# Patient Record
Sex: FEMALE | Race: WHITE | NOT HISPANIC OR LATINO | Employment: FULL TIME | URBAN - METROPOLITAN AREA
[De-identification: names, ages, dates, MRNs, and addresses within clinical notes are randomized per-mention and may not be internally consistent; named-entity substitution may affect disease eponyms.]

---

## 2023-08-28 ENCOUNTER — OFFICE VISIT (OUTPATIENT)
Dept: UROLOGY | Facility: CLINIC | Age: 53
End: 2023-08-28
Payer: COMMERCIAL

## 2023-08-28 VITALS
DIASTOLIC BLOOD PRESSURE: 71 MMHG | HEIGHT: 64 IN | HEART RATE: 71 BPM | BODY MASS INDEX: 28.46 KG/M2 | WEIGHT: 166.69 LBS | SYSTOLIC BLOOD PRESSURE: 123 MMHG

## 2023-08-28 DIAGNOSIS — R39.15 URINARY URGENCY: Primary | ICD-10-CM

## 2023-08-28 DIAGNOSIS — N32.89 BLADDER SPASMS: ICD-10-CM

## 2023-08-28 LAB
BILIRUBIN, UA POC OHS: NEGATIVE
BLOOD, UA POC OHS: NEGATIVE
CLARITY, UA POC OHS: CLEAR
COLOR, UA POC OHS: YELLOW
GLUCOSE, UA POC OHS: NEGATIVE
KETONES, UA POC OHS: NEGATIVE
LEUKOCYTES, UA POC OHS: NEGATIVE
NITRITE, UA POC OHS: NEGATIVE
PH, UA POC OHS: 5.5
PROTEIN, UA POC OHS: NEGATIVE
SPECIFIC GRAVITY, UA POC OHS: >=1.03
UROBILINOGEN, UA POC OHS: 0.2

## 2023-08-28 PROCEDURE — 99999 PR PBB SHADOW E&M-NEW PATIENT-LVL V: ICD-10-PCS | Mod: PBBFAC,,, | Performed by: NURSE PRACTITIONER

## 2023-08-28 PROCEDURE — 3074F PR MOST RECENT SYSTOLIC BLOOD PRESSURE < 130 MM HG: ICD-10-PCS | Mod: CPTII,S$GLB,, | Performed by: NURSE PRACTITIONER

## 2023-08-28 PROCEDURE — 99204 PR OFFICE/OUTPT VISIT, NEW, LEVL IV, 45-59 MIN: ICD-10-PCS | Mod: S$GLB,,, | Performed by: NURSE PRACTITIONER

## 2023-08-28 PROCEDURE — 1160F PR REVIEW ALL MEDS BY PRESCRIBER/CLIN PHARMACIST DOCUMENTED: ICD-10-PCS | Mod: CPTII,S$GLB,, | Performed by: NURSE PRACTITIONER

## 2023-08-28 PROCEDURE — 81003 URINALYSIS AUTO W/O SCOPE: CPT | Mod: QW,S$GLB,, | Performed by: NURSE PRACTITIONER

## 2023-08-28 PROCEDURE — 81003 POCT URINALYSIS(INSTRUMENT): ICD-10-PCS | Mod: QW,S$GLB,, | Performed by: NURSE PRACTITIONER

## 2023-08-28 PROCEDURE — 3008F PR BODY MASS INDEX (BMI) DOCUMENTED: ICD-10-PCS | Mod: CPTII,S$GLB,, | Performed by: NURSE PRACTITIONER

## 2023-08-28 PROCEDURE — 99999 PR PBB SHADOW E&M-NEW PATIENT-LVL V: CPT | Mod: PBBFAC,,, | Performed by: NURSE PRACTITIONER

## 2023-08-28 PROCEDURE — 1159F MED LIST DOCD IN RCRD: CPT | Mod: CPTII,S$GLB,, | Performed by: NURSE PRACTITIONER

## 2023-08-28 PROCEDURE — 3078F PR MOST RECENT DIASTOLIC BLOOD PRESSURE < 80 MM HG: ICD-10-PCS | Mod: CPTII,S$GLB,, | Performed by: NURSE PRACTITIONER

## 2023-08-28 PROCEDURE — 3008F BODY MASS INDEX DOCD: CPT | Mod: CPTII,S$GLB,, | Performed by: NURSE PRACTITIONER

## 2023-08-28 PROCEDURE — 3078F DIAST BP <80 MM HG: CPT | Mod: CPTII,S$GLB,, | Performed by: NURSE PRACTITIONER

## 2023-08-28 PROCEDURE — 99204 OFFICE O/P NEW MOD 45 MIN: CPT | Mod: S$GLB,,, | Performed by: NURSE PRACTITIONER

## 2023-08-28 PROCEDURE — 1160F RVW MEDS BY RX/DR IN RCRD: CPT | Mod: CPTII,S$GLB,, | Performed by: NURSE PRACTITIONER

## 2023-08-28 PROCEDURE — 3074F SYST BP LT 130 MM HG: CPT | Mod: CPTII,S$GLB,, | Performed by: NURSE PRACTITIONER

## 2023-08-28 PROCEDURE — 1159F PR MEDICATION LIST DOCUMENTED IN MEDICAL RECORD: ICD-10-PCS | Mod: CPTII,S$GLB,, | Performed by: NURSE PRACTITIONER

## 2023-08-28 RX ORDER — OXYBUTYNIN CHLORIDE 5 MG/1
5 TABLET, EXTENDED RELEASE ORAL DAILY
Qty: 30 TABLET | Refills: 11 | Status: SHIPPED | OUTPATIENT
Start: 2023-08-28 | End: 2023-09-13 | Stop reason: SDUPTHER

## 2023-08-28 RX ORDER — ESZOPICLONE 1 MG/1
1 TABLET, FILM COATED ORAL NIGHTLY
COMMUNITY

## 2023-08-28 RX ORDER — DIPHENHYDRAMINE HCL 25 MG
25 CAPSULE ORAL NIGHTLY
COMMUNITY

## 2023-08-28 RX ORDER — TOPIRAMATE 50 MG/1
50 TABLET, FILM COATED ORAL DAILY
COMMUNITY
Start: 2023-02-16 | End: 2024-06-28

## 2023-08-28 RX ORDER — PREDNISONE 10 MG/1
10 TABLET ORAL DAILY PRN
COMMUNITY
Start: 2023-06-30

## 2023-08-28 RX ORDER — BUPROPION HYDROCHLORIDE 150 MG/1
150 TABLET ORAL EVERY MORNING
COMMUNITY
Start: 2023-06-30

## 2023-08-28 RX ORDER — HYOSCYAMINE SULFATE 0.12 MG/1
0.25 TABLET SUBLINGUAL 2 TIMES DAILY
COMMUNITY
Start: 2023-05-09 | End: 2023-08-28 | Stop reason: ALTCHOICE

## 2023-08-28 RX ORDER — TRAMADOL HYDROCHLORIDE 50 MG/1
50 TABLET ORAL EVERY 6 HOURS PRN
COMMUNITY
Start: 2023-03-23

## 2023-08-28 NOTE — PROGRESS NOTES
"CHIEF COMPLAINT:    Mrs Kothari is a 52 y.o. female presenting for bladder spasms.  PRESENTING ILLNESS:    Natalia Kothari is a 52 y.o. female who presents for bladder spasms. This is her initial clinic visit.    8/28/23  Pt presents today for bladder spasms. She feels she has a constant urge to void but does not have to void. She voids every few hours throughout the day and nocturia x 0-1. No UUI or SHANNAN. She finds symptoms have been ongoing for a few months.  Denies dysuria, gross hematuria, flank pain, fever, chills, nausea or vomiting today.  UA today negative  PVR 32 ml    She was treated for UTI 7/6/23 by PCP (e coli), with macrobid. UTI symptoms included back pain, frequency, dysuria and bladder spasms. She was seen at urgent care a few days after completing macrobid for UTI and prescribed another antibiotic but no culture obtained. She was seen again by PCP 8/8/23 for dysuria and repeat UA (abnormal but on pyridium) and culture no growth. She was also treated for yeast infection with diflucan.      Drinks: 3 16oz glasses of water daily, tea, wine  Has IBS, constipation and diarrhea    Hx of hysterectomy 1999, no bladder surgeries    History of kidney stones: denies  History of recurrent UTI: had UTI's as a child but none as adult until now  Personal or family hx of  malignancy: aunt with RCC, had nephrectomy. No hx of bladder cancer  History of  trauma: denies  Smoking history: former smoker, quit 2010    Urine cultures:   No results found for: "LABURIN"      REVIEW OF SYSTEMS:    Review of Systems    Constitutional: Negative for fever and chills.   HENT: Negative for hearing loss.   Eyes: Negative for visual disturbance.   Respiratory: Negative for shortness of breath.   Cardiovascular: Negative for chest pain.   Gastrointestinal: Negative for nausea, vomiting  Genitourinary:  See above  Neurological: Negative for dizziness.   Hematological: Does not bruise/bleed easily.   Psychiatric/Behavioral: " Negative for confusion.     PATIENT HISTORY:    Past Medical History:   Diagnosis Date    Arthritis     Autoimmune disease     swelling of joints with pain    H/O:  section     Pancreatitis     PONV (postoperative nausea and vomiting)     Sinusitis        Past Surgical History:   Procedure Laterality Date    CHOLECYSTECTOMY      gastric sleeve      HYSTERECTOMY      SHOULDER ARTHROSCOPY         Family History   Problem Relation Age of Onset    Collagen disease Neg Hx        Social History     Socioeconomic History    Marital status:    Tobacco Use    Smoking status: Former    Smokeless tobacco: Never    Tobacco comments:     5 years quit    Substance and Sexual Activity    Alcohol use: Yes     Alcohol/week: 3.0 standard drinks of alcohol     Types: 3 Glasses of wine per week     Comment: 3 glasses/wk     Drug use: No       Allergies:  Nsaids (non-steroidal anti-inflammatory drug) and Sulfa (sulfonamide antibiotics)    Medications:    Current Outpatient Medications:     b complex vitamins tablet, Take 1 tablet by mouth once daily., Disp: , Rfl:     buPROPion (WELLBUTRIN XL) 150 MG TB24 tablet, Take 150 mg by mouth every morning., Disp: , Rfl:     calcium carbonate (OS-AVELINA) 500 mg calcium (1,250 mg) tablet, Take 1 tablet by mouth once daily., Disp: , Rfl:     cetirizine (ZYRTEC) 10 MG tablet, Take 10 mg by mouth once daily., Disp: , Rfl:     diphenhydrAMINE (BENADRYL) 25 mg capsule, Take 25 mg by mouth every evening., Disp: , Rfl:     eszopiclone (LUNESTA) 1 MG Tab, Take 1 mg by mouth nightly., Disp: , Rfl:     ferrous sulfate 325 mg (65 mg iron) Tab tablet, Take 325 mg by mouth daily with breakfast., Disp: , Rfl:     fluoxetine (PROZAC) 40 MG capsule, Take 40 mg by mouth once daily., Disp: , Rfl:     fluticasone (FLONASE) 50 mcg/actuation nasal spray, , Disp: , Rfl:     hydroxychloroquine (PLAQUENIL) 200 mg tablet, Take 200 mg by mouth 2 (two) times daily. , Disp: , Rfl:     methocarbamol (ROBAXIN) 500  MG Tab, Take 750 mg by mouth 3 (three) times daily., Disp: , Rfl:     multivitamin capsule, Take 1 capsule by mouth once daily., Disp: , Rfl:     omeprazole (PRILOSEC) 40 MG capsule, Take 40 mg by mouth once daily., Disp: , Rfl:     predniSONE (DELTASONE) 10 MG tablet, Take 10 mg by mouth daily as needed., Disp: , Rfl:     topiramate (TOPAMAX) 50 MG tablet, Take 50 mg by mouth once daily., Disp: , Rfl:     traMADoL (ULTRAM) 50 mg tablet, Take 50 mg by mouth every 6 (six) hours as needed., Disp: , Rfl:     vitamin D 1000 units Tab, Take 185 mg by mouth once daily., Disp: , Rfl:     cyanocobalamin (VITAMIN B-12) 1,000 mcg/mL injection, 1,000 mcg every 28 days., Disp: , Rfl:     hydrocodone-acetaminophen 5-325mg (NORCO) 5-325 mg per tablet, Take 1 tablet by mouth every 6 (six) hours as needed for Pain., Disp: 40 tablet, Rfl: 0    oxybutynin (DITROPAN-XL) 5 MG TR24, Take 1 tablet (5 mg total) by mouth once daily., Disp: 30 tablet, Rfl: 11    PHYSICAL EXAMINATION:    Constitutional: She is oriented to person, place, and time. She appears well-developed and well-nourished.  She is in no apparent distress.    Neck: Normal ROM.     Cardiovascular: Normal rate.      Pulmonary/Chest: Effort normal. No respiratory distress.     Abdominal:  She exhibits no distension.  There is no CVA tenderness.     Neurological: She is alert and oriented to person, place, and time.     Skin: Skin is warm and dry.     Psych: Cooperative with normal affect.    Genitourinary:  Normal external female genitalia  Urethral meatus is normal  Mild prolapse    Physical Exam      LABS:    Lab Results   Component Value Date    CREATININE 0.7 12/10/2014       IMPRESSION:    Encounter Diagnoses   Name Primary?    Urinary urgency Yes    Bladder spasms        PLAN:  -DOROTHY ordered and scheduled  -Will try oxybutynin daily for bladder spasms, constant urge to void  Discussed side effects and indications for oxybutynin. Prescription sent to the pharmacy. Pt  verbalized understanding.  Conservative measures:  1. Avoiding/minimizing bladder irritants (see below), especially in afternoon and evening hours  Discussed bladder irritants include coffe (even decaf), tea, alcohol, soda, spicy foods, acidic juices (orange, tomato), vinegar, and artificial sweeteners/sugary beverages.  2. timed voiding - empty on a schedule (approx ~2-3 hours) in spite of need to urinate, to get ahead of urge  3. dont postponing voiding - dont hold it on purpose   4. bowel regimen as distended bowel has extrinsic compressive effect on bladder.   - any or all of the following in any combination, titrate to soft daily bowel movement without pushing or straining  - colace/stool softener capsule - once to twice daily  - miralax - 1 capful daily to start, can increase to 2x daily (or decrease to 1/2 cap daily)  - increase dietary fibery  - fiber supplements, such as metamucil  - prunes, prune juice  5. INCREASE water intake  6. Stop fluids 2 hours before bed, and urinate just before bed    -Mild prolapse. No SHANNAN or UUI. Emptying bladder well.  Kegels and pelvic floor exercises to strengthen pelvic floor    -RTC 8 weeks    I encouraged her or any of her family members to call or email me with questions and/or concerns.      45 minutes of total time spent on the encounter, which includes face to face time and non-face to face time preparing to see the patient (eg, review of tests), Obtaining and/or reviewing separately obtained history, Documenting clinical information in the electronic or other health record, Independently interpreting results (not separately reported) and communicating results to the patient/family/caregiver, or Care coordination (not separately reported).

## 2023-09-13 ENCOUNTER — PATIENT MESSAGE (OUTPATIENT)
Dept: UROLOGY | Facility: CLINIC | Age: 53
End: 2023-09-13
Payer: COMMERCIAL

## 2023-09-13 DIAGNOSIS — R39.15 URINARY URGENCY: ICD-10-CM

## 2023-09-13 DIAGNOSIS — N32.89 BLADDER SPASMS: ICD-10-CM

## 2023-09-13 RX ORDER — OXYBUTYNIN CHLORIDE 5 MG/1
5 TABLET, EXTENDED RELEASE ORAL DAILY
Qty: 90 TABLET | Refills: 3 | Status: SHIPPED | OUTPATIENT
Start: 2023-09-13 | End: 2024-09-12

## 2024-04-02 ENCOUNTER — OFFICE VISIT (OUTPATIENT)
Dept: URGENT CARE | Facility: CLINIC | Age: 54
End: 2024-04-02
Payer: COMMERCIAL

## 2024-04-02 VITALS
BODY MASS INDEX: 29.37 KG/M2 | TEMPERATURE: 98 F | SYSTOLIC BLOOD PRESSURE: 121 MMHG | OXYGEN SATURATION: 100 % | DIASTOLIC BLOOD PRESSURE: 67 MMHG | RESPIRATION RATE: 16 BRPM | HEART RATE: 75 BPM | WEIGHT: 172 LBS | HEIGHT: 64 IN

## 2024-04-02 DIAGNOSIS — R00.2 PALPITATIONS: Primary | ICD-10-CM

## 2024-04-02 DIAGNOSIS — Z87.09 HISTORY OF SINUSITIS: ICD-10-CM

## 2024-04-02 DIAGNOSIS — H92.01 RIGHT EAR PAIN: ICD-10-CM

## 2024-04-02 DIAGNOSIS — H66.93 ACUTE OTITIS MEDIA, BILATERAL: ICD-10-CM

## 2024-04-02 PROCEDURE — 99204 OFFICE O/P NEW MOD 45 MIN: CPT | Mod: S$GLB,,, | Performed by: STUDENT IN AN ORGANIZED HEALTH CARE EDUCATION/TRAINING PROGRAM

## 2024-04-02 RX ORDER — FLUCONAZOLE 150 MG/1
150 TABLET ORAL
Qty: 3 TABLET | Refills: 0 | Status: SHIPPED | OUTPATIENT
Start: 2024-04-02

## 2024-04-02 RX ORDER — AMOXICILLIN AND CLAVULANATE POTASSIUM 875; 125 MG/1; MG/1
1 TABLET, FILM COATED ORAL EVERY 12 HOURS
Qty: 20 TABLET | Refills: 0 | Status: SHIPPED | OUTPATIENT
Start: 2024-04-02 | End: 2024-04-12

## 2024-04-02 NOTE — PROGRESS NOTES
"Subjective:      Patient ID: Natalia Kothari is a 53 y.o. female.    Vitals:  height is 5' 4" (1.626 m) and weight is 78 kg (172 lb). Her oral temperature is 98.2 °F (36.8 °C). Her blood pressure is 121/67 and her pulse is 75. Her respiration is 16 and oxygen saturation is 100%.     Chief Complaint: Otalgia and Palpitations    Patient is a 53-year-old female with a past medical history of DDD, cervical spinal stenosis, arthritis, bursitis, insomnia, GERD, rhinitis, sinusitis, and obstructive sleep apnea who presents to clinic for evaluation of 2 separate complaints.  Patient reports the 1st is that of ear pain and sinusitis.  Patient states the 2nd is that of palpitations.  Patient previously seen by allergist on March 28, 2024 diagnosed with sinusitis where placed on amoxicillin 875.  Patient reports this is not helping significantly.  Patient states she was also taking Flonase, Astelin, Benadryl, Allegra, and Xyzal as directed via allergist.  Patient states continues to have sinus pressure sinus pain and congestion.  Patient states she is now also having right-sided ear pain.  Patient denies any trauma or injury to the ears.  Patient denies any tinnitus, hearing loss or drainage from ears.  Patient states having sinus related headaches.  Patient states the 2nd complaint she has experiences that of palpitations.  Patient states has a history of these.  Patient states symptoms began again today.  Patient states has a history of PVCs.  Patient states these typically occur when her potassium is low.  Patient states that she is coming in today to get her blood drawn in clinic and possibly get paced on medications.  Patient states she does follow Cardiology however has not done so in a few months.  Patient states she does not actually have any chest pain, shortness for breath, leg swelling, diaphoresis, abdominal pain, nausea or vomiting, fever or chills, dizziness, or change in mentation.      Constitution: " Negative. Negative for chills, sweating, fatigue and fever.   HENT:  Positive for ear pain (Right ear pain), congestion, sinus pain and sinus pressure. Negative for ear discharge, tinnitus, hearing loss and sore throat.    Neck: neck negative.   Cardiovascular:  Positive for palpitations. Negative for chest pain, leg swelling, sob on exertion and passing out.   Eyes: Negative.    Respiratory: Negative.  Negative for chest tightness, cough and shortness of breath.    Gastrointestinal: Negative.  Negative for abdominal pain, nausea, vomiting and diarrhea.   Endocrine: negative.   Genitourinary: Negative.    Musculoskeletal: Negative.  Negative for muscle ache.   Skin: Negative.  Negative for color change, pale, rash and erythema.   Allergic/Immunologic: Positive for recurrent sinus infections.   Neurological:  Positive for headaches. Negative for dizziness, light-headedness, passing out, disorientation and altered mental status.   Hematologic/Lymphatic: Negative.    Psychiatric/Behavioral:  Negative for altered mental status, disorientation and confusion.       Objective:     Physical Exam   Constitutional: She is oriented to person, place, and time. She appears well-developed. She is cooperative.  Non-toxic appearance. She does not appear ill. No distress.   HENT:   Head: Normocephalic and atraumatic.   Ears:   Right Ear: Hearing, external ear and ear canal normal. No no drainage, swelling or tenderness. Tympanic membrane is erythematous and bulging. Tympanic membrane is not perforated. No decreased hearing is noted.   Left Ear: Hearing, external ear and ear canal normal. No no drainage, swelling or tenderness. Tympanic membrane is erythematous and bulging. Tympanic membrane is not perforated. No decreased hearing is noted.   Nose: Congestion present. No mucosal edema, rhinorrhea or nasal deformity. No epistaxis. Right sinus exhibits maxillary sinus tenderness and frontal sinus tenderness. Left sinus exhibits  maxillary sinus tenderness and frontal sinus tenderness.   Mouth/Throat: Uvula is midline, oropharynx is clear and moist and mucous membranes are normal. Mucous membranes are moist. No trismus in the jaw. Normal dentition. No uvula swelling. No oropharyngeal exudate or posterior oropharyngeal erythema. Oropharynx is clear.   Eyes: Conjunctivae and lids are normal. Pupils are equal, round, and reactive to light. Right eye exhibits no discharge. Left eye exhibits no discharge. No scleral icterus.   Neck: Trachea normal and phonation normal. Neck supple. No neck rigidity present.   Cardiovascular: Normal rate, regular rhythm, normal heart sounds and normal pulses.   Pulmonary/Chest: Effort normal and breath sounds normal. No respiratory distress (Unlabored.  Equal rise and fall of chest.  Able speak in full complete sentences.  No adventitious breath sounds noted.). She has no wheezes. She has no rhonchi. She has no rales.   Abdominal: Normal appearance and bowel sounds are normal. She exhibits no distension. Soft. There is no abdominal tenderness.   Musculoskeletal: Normal range of motion.         General: Normal range of motion.      Cervical back: She exhibits no tenderness.   Lymphadenopathy:     She has no cervical adenopathy.   Neurological: She is alert and oriented to person, place, and time. She exhibits normal muscle tone.   Skin: Skin is warm, dry, intact, not diaphoretic, not pale and no rash. Capillary refill takes 2 to 3 seconds. No erythema   Psychiatric: Her speech is normal and behavior is normal. Judgment and thought content normal.   Nursing note and vitals reviewed.      Assessment:     1. Palpitations    2. Right ear pain    3. Acute otitis media, bilateral    4. History of sinusitis        Plan:       Palpitations  -     Cancel: EKG 12-lead; Future    Right ear pain    Acute otitis media, bilateral    History of sinusitis    Other orders  -     amoxicillin-clavulanate 875-125mg (AUGMENTIN) 875-125  mg per tablet; Take 1 tablet by mouth every 12 (twelve) hours. for 10 days  Dispense: 20 tablet; Refill: 0  -     fluconazole (DIFLUCAN) 150 MG Tab; Take 1 tablet (150 mg total) by mouth every 72 hours.  Dispense: 3 tablet; Refill: 0                Unable to perform EKG in clinic.  Machine out of service.  Patient advised that we do not have in-house lab services.  Patient states that she thought we could do stat blood work in clinic which is why she came.  Advised that blood work would have to be sent off to [facility].  Recommended patient present to emergency department for further evaluation and treatment of palpitations.    Discussed close follow-up with cardiologist once discharged.    Follow-up with PCP once discharged.    Continue follow-up with allergist as recommended.    Discontinue amoxicillin and start Augmentin for sinusitis and otitis media.  Continue previously prescribed nasal sprays and antihistamines as directed.    Nasal saline flushes or irrigation as directed.    Tylenol per package instructions for any pain or fever.    Assure adequate hydration.    Patient offered EMS however refused.  Risk versus benefits including possibility of death explained to patient.  Patient has clear verbal understanding.  EMS refusal form signed.  See scanned document.      DISCLAIMER: Please note that my documentation in this Electronic Healthcare Record was produced using speech recognition software and therefore may contain errors related to that software system.These could include grammar, punctuation and spelling errors or the inclusion/exclusion of phrases that were not intended. Garbled syntax, mangled pronouns, and other bizarre constructions may be attributed to that software system.

## 2025-04-28 ENCOUNTER — OFFICE VISIT (OUTPATIENT)
Dept: URGENT CARE | Facility: CLINIC | Age: 55
End: 2025-04-28
Payer: COMMERCIAL

## 2025-04-28 VITALS
OXYGEN SATURATION: 99 % | RESPIRATION RATE: 16 BRPM | HEIGHT: 64 IN | TEMPERATURE: 99 F | BODY MASS INDEX: 25.44 KG/M2 | DIASTOLIC BLOOD PRESSURE: 81 MMHG | SYSTOLIC BLOOD PRESSURE: 119 MMHG | WEIGHT: 149 LBS | HEART RATE: 77 BPM

## 2025-04-28 DIAGNOSIS — B02.9 HERPES ZOSTER WITHOUT COMPLICATION: ICD-10-CM

## 2025-04-28 DIAGNOSIS — R21 RASH: Primary | ICD-10-CM

## 2025-04-28 PROCEDURE — 99213 OFFICE O/P EST LOW 20 MIN: CPT | Mod: S$GLB,,, | Performed by: STUDENT IN AN ORGANIZED HEALTH CARE EDUCATION/TRAINING PROGRAM

## 2025-04-28 RX ORDER — VALACYCLOVIR HYDROCHLORIDE 500 MG/1
500 TABLET, FILM COATED ORAL 3 TIMES DAILY
Qty: 21 TABLET | Refills: 0 | Status: SHIPPED | OUTPATIENT
Start: 2025-04-28 | End: 2025-05-05

## 2025-04-28 RX ORDER — ATOGEPANT 60 MG/1
1 TABLET ORAL
COMMUNITY

## 2025-04-28 NOTE — PROGRESS NOTES
"Subjective:      Patient ID: Natalia Kothari is a 54 y.o. female.    Vitals:  height is 5' 4" (1.626 m) and weight is 67.6 kg (149 lb). Her temperature is 98.6 °F (37 °C). Her blood pressure is 119/81 and her pulse is 77. Her respiration is 16 and oxygen saturation is 99%.     Chief Complaint: Rash    Patient is a 54-year-old female with a past medical history of DDD, cervical spinal stenosis, arthritis, bursitis, insomnia, GERD, rhinitis, sinusitis, and obstructive sleep apnea who presents to clinic for evaluation of possible shingles.  Patient reports symptoms for approximately 2-3 days.  Patient states had a similar rash like this 10 years ago.  Patient states got Valtrex and symptoms quickly resolved.  Patient states symptoms feel identical to that.  Patient states blistering rash on the left side of the face just underneath the nostril.  Patient states no known sick exposures.  Patient states not in contact with anyone else with similar rash.  Patient states no new contacts or exposures.    Rash  This is a new problem. The current episode started in the past 7 days (2 days). The problem is unchanged. The affected locations include the face. The rash is characterized by burning, redness, pain and dryness. She was exposed to nothing. Pertinent negatives include no cough, diarrhea, fatigue, fever, shortness of breath or vomiting. Past treatments include nothing.       Constitution: Negative. Negative for chills, sweating, fatigue and fever.   HENT: Negative.     Neck: neck negative.   Cardiovascular: Negative.  Negative for chest pain and palpitations.   Eyes: Negative.    Respiratory: Negative.  Negative for chest tightness, cough and shortness of breath.    Gastrointestinal: Negative.  Negative for abdominal pain, nausea, vomiting and diarrhea.   Endocrine: negative.   Genitourinary: Negative.  Negative for dysuria, frequency and urgency.   Musculoskeletal: Negative.    Skin:  Positive for rash. "   Allergic/Immunologic: Negative.    Neurological: Negative.  Negative for dizziness, light-headedness, passing out, headaches, disorientation and altered mental status.   Hematologic/Lymphatic: Negative.    Psychiatric/Behavioral: Negative.  Negative for altered mental status, disorientation and confusion.       Objective:     Physical Exam   Constitutional: She is oriented to person, place, and time. She appears well-developed. She is cooperative.  Non-toxic appearance. She does not appear ill. No distress.   HENT:   Head: Normocephalic and atraumatic.   Ears:   Right Ear: Hearing and external ear normal.   Left Ear: Hearing and external ear normal.   Nose: Nose normal. No mucosal edema or nasal deformity. No epistaxis. Right sinus exhibits no maxillary sinus tenderness and no frontal sinus tenderness. Left sinus exhibits no maxillary sinus tenderness and no frontal sinus tenderness.   Mouth/Throat: Uvula is midline, oropharynx is clear and moist and mucous membranes are normal. Mucous membranes are moist. No trismus in the jaw. Normal dentition. No uvula swelling. Oropharynx is clear.   Eyes: Conjunctivae and lids are normal. Pupils are equal, round, and reactive to light. Right eye exhibits no discharge. Left eye exhibits no discharge. No scleral icterus.   Neck: Trachea normal and phonation normal. Neck supple. No neck rigidity present.   Cardiovascular: Normal rate, regular rhythm and normal pulses.   Pulmonary/Chest: Effort normal and breath sounds normal. No respiratory distress. She has no wheezes. She has no rhonchi. She has no rales.   Abdominal: Normal appearance and bowel sounds are normal. She exhibits no distension. Soft. There is no abdominal tenderness.   Musculoskeletal: Normal range of motion.         General: Normal range of motion.      Cervical back: She exhibits no tenderness.   Lymphadenopathy:     She has no cervical adenopathy.   Neurological: She is alert and oriented to person, place, and  time. She exhibits normal muscle tone.   Skin: Skin is warm, dry, intact, not diaphoretic, not pale, rash and vesicular (Vesicular rash to the left side of face just below the nostril.  Unilateral.  Not cross midline.). Capillary refill takes 2 to 3 seconds.   Psychiatric: Her speech is normal and behavior is normal. Judgment and thought content normal.   Nursing note and vitals reviewed.      Assessment:     1. Rash    2. Herpes zoster without complication        Plan:       Rash    Herpes zoster without complication    Other orders  -     valACYclovir (VALTREX) 500 MG tablet; Take 1 tablet (500 mg total) by mouth 3 (three) times daily. for 7 days  Dispense: 21 tablet; Refill: 0                Take medications as prescribed.  Tylenol/Motrin per package instructions for any pain or fever.  Follow-up with PCP in 1-2 days.  Return to clinic as needed.  To ED for any new or acutely worsening symptoms.  Patient in agreement with plan of care.    DISCLAIMER: Please note that my documentation in this Electronic Healthcare Record was produced using speech recognition software and therefore may contain errors related to that software system.These could include grammar, punctuation and spelling errors or the inclusion/exclusion of phrases that were not intended. Garbled syntax, mangled pronouns, and other bizarre constructions may be attributed to that software system.